# Patient Record
Sex: MALE | Race: BLACK OR AFRICAN AMERICAN | URBAN - METROPOLITAN AREA
[De-identification: names, ages, dates, MRNs, and addresses within clinical notes are randomized per-mention and may not be internally consistent; named-entity substitution may affect disease eponyms.]

---

## 2020-11-18 ENCOUNTER — OFFICE VISIT (OUTPATIENT)
Dept: URBAN - METROPOLITAN AREA CLINIC 118 | Facility: CLINIC | Age: 80
End: 2020-11-18
Payer: MEDICARE

## 2020-11-18 DIAGNOSIS — K62.5 RECTAL BLEEDING: ICD-10-CM

## 2020-11-18 DIAGNOSIS — Z12.11 COLON CANCER SCREENING: ICD-10-CM

## 2020-11-18 DIAGNOSIS — D63.8 ANEMIA OF CHRONIC DISEASE: ICD-10-CM

## 2020-11-18 PROCEDURE — G8482 FLU IMMUNIZE ORDER/ADMIN: HCPCS | Performed by: INTERNAL MEDICINE

## 2020-11-18 PROCEDURE — G8420 CALC BMI NORM PARAMETERS: HCPCS | Performed by: INTERNAL MEDICINE

## 2020-11-18 PROCEDURE — 99214 OFFICE O/P EST MOD 30 MIN: CPT | Performed by: INTERNAL MEDICINE

## 2020-11-18 PROCEDURE — G8427 DOCREV CUR MEDS BY ELIG CLIN: HCPCS | Performed by: INTERNAL MEDICINE

## 2020-11-18 PROCEDURE — 1036F TOBACCO NON-USER: CPT | Performed by: INTERNAL MEDICINE

## 2020-11-18 RX ORDER — LEVOTHYROXINE SODIUM 0.05 MG/1
TABLET ORAL
Qty: 0 | Refills: 0 | Status: ACTIVE | COMMUNITY
Start: 1900-01-01

## 2020-11-18 RX ORDER — FINASTERIDE 5 MG/1
TAKE 1 TABLET (5 MG) BY ORAL ROUTE ONCE DAILY TABLET, FILM COATED ORAL 1
Qty: 0 | Refills: 0 | Status: ACTIVE | COMMUNITY
Start: 1900-01-01

## 2020-11-18 RX ORDER — CLOPIDOGREL BISULFATE 75 MG
TAKE 1 TABLET (75 MG) BY ORAL ROUTE ONCE DAILY TABLET ORAL 1
Qty: 0 | Refills: 0 | Status: DISCONTINUED | COMMUNITY
Start: 1900-01-01

## 2020-11-18 NOTE — HPI-TODAY'S VISIT:
The patient was last seen in January for rectal bleeding, and a colonoscopy was planned.  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  He now feels markedly better with less symptoms from his aortic stenosis and is able to walk up a flight of stairs without severe shortness of breath.  He remains on Xarelto for his atrial fibrillation and still sees occasional bright red blood per rectum.  However there is no history of severe anemia.  He is compliant with daily Benefiber therapy and this is helped his irregular bowel movements.  There is no family history of colon cancer or polyps.  He denies chest pain, abdominal pain, or abnormal loss of weight.   He has chronic anemia, but iron levels WNL at Renal (Stage III CKD).

## 2020-11-19 PROBLEM — 234347009: Status: ACTIVE | Noted: 2020-11-18

## 2020-12-15 ENCOUNTER — OFFICE VISIT (OUTPATIENT)
Dept: URBAN - METROPOLITAN AREA MEDICAL CENTER 16 | Facility: MEDICAL CENTER | Age: 80
End: 2020-12-15
Payer: MEDICARE

## 2020-12-15 DIAGNOSIS — K62.5 ANAL BLEEDING: ICD-10-CM

## 2020-12-15 DIAGNOSIS — D12.4 ADENOMA OF DESCENDING COLON: ICD-10-CM

## 2020-12-15 DIAGNOSIS — Z86.010 H/O ADENOMATOUS POLYP OF COLON: ICD-10-CM

## 2020-12-15 PROCEDURE — 45385 COLONOSCOPY W/LESION REMOVAL: CPT | Performed by: INTERNAL MEDICINE

## 2020-12-15 PROCEDURE — G9937 DIG OR SURV COLSCO: HCPCS | Performed by: INTERNAL MEDICINE

## 2020-12-15 RX ORDER — FINASTERIDE 5 MG/1
TAKE 1 TABLET (5 MG) BY ORAL ROUTE ONCE DAILY TABLET, FILM COATED ORAL 1
Qty: 0 | Refills: 0 | Status: ACTIVE | COMMUNITY
Start: 1900-01-01

## 2020-12-15 RX ORDER — LEVOTHYROXINE SODIUM 0.05 MG/1
TABLET ORAL
Qty: 0 | Refills: 0 | Status: ACTIVE | COMMUNITY
Start: 1900-01-01

## 2022-10-28 ENCOUNTER — OFFICE VISIT (OUTPATIENT)
Dept: URBAN - METROPOLITAN AREA CLINIC 118 | Facility: CLINIC | Age: 82
End: 2022-10-28
Payer: MEDICARE

## 2022-10-28 VITALS
DIASTOLIC BLOOD PRESSURE: 66 MMHG | TEMPERATURE: 97.2 F | HEIGHT: 70 IN | SYSTOLIC BLOOD PRESSURE: 113 MMHG | WEIGHT: 160.6 LBS | HEART RATE: 78 BPM | BODY MASS INDEX: 22.99 KG/M2

## 2022-10-28 DIAGNOSIS — R15.0 INCOMPLETE DEFECATION: ICD-10-CM

## 2022-10-28 DIAGNOSIS — R19.5 LOOSE STOOLS: ICD-10-CM

## 2022-10-28 PROCEDURE — 99213 OFFICE O/P EST LOW 20 MIN: CPT

## 2022-10-28 RX ORDER — LEVOTHYROXINE SODIUM 0.05 MG/1
TABLET ORAL
Qty: 0 | Refills: 0 | Status: ACTIVE | COMMUNITY
Start: 1900-01-01

## 2022-10-28 RX ORDER — FINASTERIDE 5 MG/1
TAKE 1 TABLET (5 MG) BY ORAL ROUTE ONCE DAILY TABLET, FILM COATED ORAL 1
Qty: 0 | Refills: 0 | Status: ACTIVE | COMMUNITY
Start: 1900-01-01

## 2022-10-28 RX ORDER — CHOLESTYRAMINE 4 G/9G
1 SCOOP POWDER, FOR SUSPENSION ORAL ONCE A DAY
Qty: 1 | Refills: 2 | OUTPATIENT
Start: 2022-10-28

## 2022-10-28 NOTE — HPI-TODAY'S VISIT:
Mr. Monroy is a 81 y/o WM who presents today due to loose stools.  He reports for the past several years he has been having loose stool, not diarrhea but states they are "like mud consistency". He will have a BM and then 15 minutes later more will come out, but he does not always have the senstation to have another BM. He is at times have 5+ bowel movements/day.  He denies any GI bleeding, abdominal pain, unintentional weight loss. He was previously taking benefiber which helped but recently symptoms have been worsening.  His last colonoscopy was 12/15/20 with a 1mm polyp, diverticulosis and internal hemorrhoids. Recommendation was no need for repeat colonoscopy due to pt age.

## 2023-01-25 ENCOUNTER — OFFICE VISIT (OUTPATIENT)
Dept: URBAN - METROPOLITAN AREA CLINIC 118 | Facility: CLINIC | Age: 83
End: 2023-01-25
Payer: MEDICARE

## 2023-01-25 VITALS
WEIGHT: 163 LBS | BODY MASS INDEX: 23.34 KG/M2 | HEIGHT: 70 IN | HEART RATE: 73 BPM | TEMPERATURE: 97.2 F | DIASTOLIC BLOOD PRESSURE: 70 MMHG | SYSTOLIC BLOOD PRESSURE: 103 MMHG

## 2023-01-25 DIAGNOSIS — R19.5 LOOSE STOOLS: ICD-10-CM

## 2023-01-25 PROCEDURE — 99213 OFFICE O/P EST LOW 20 MIN: CPT

## 2023-01-25 RX ORDER — CHOLESTYRAMINE 4 G/9G
1 SCOOP POWDER, FOR SUSPENSION ORAL ONCE A DAY
Qty: 30 | Refills: 3 | OUTPATIENT
Start: 2023-01-25

## 2023-01-25 RX ORDER — FINASTERIDE 5 MG/1
TAKE 1 TABLET (5 MG) BY ORAL ROUTE ONCE DAILY TABLET, FILM COATED ORAL 1
Qty: 0 | Refills: 0 | Status: ACTIVE | COMMUNITY
Start: 1900-01-01

## 2023-01-25 RX ORDER — FUROSEMIDE 20 MG/1
1 TABLET TABLET ORAL ONCE A DAY
Status: ACTIVE | COMMUNITY

## 2023-01-25 RX ORDER — CHOLESTYRAMINE 4 G/9G
1 SCOOP POWDER, FOR SUSPENSION ORAL ONCE A DAY
Qty: 1 | Refills: 2 | Status: ACTIVE | COMMUNITY
Start: 2022-10-28

## 2023-01-25 RX ORDER — LEVOTHYROXINE SODIUM 0.05 MG/1
TABLET ORAL
Qty: 0 | Refills: 0 | Status: ACTIVE | COMMUNITY
Start: 1900-01-01

## 2023-01-25 NOTE — HPI-TODAY'S VISIT:
Pt is a 81 y/o WM who presents today for f/u on loose stools/ fecal incontinence.  He reports symptoms are much better. Was previously have 5-6 loose/ "muddy" stools/ day, now having 1-2 formed stools/day. Still will some intermittent incontience of liquid, but overall better. Only having loose stools 2-3x/week.  Currently taking cholestyramine daily and also flax seeds which have helped per patient.  Denies abdominal pain, GI bleeding, unintentional weight loss, NV, fever.

## 2023-01-27 ENCOUNTER — OFFICE VISIT (OUTPATIENT)
Dept: URBAN - METROPOLITAN AREA CLINIC 118 | Facility: CLINIC | Age: 83
End: 2023-01-27

## 2023-04-24 ENCOUNTER — OFFICE VISIT (OUTPATIENT)
Dept: URBAN - METROPOLITAN AREA CLINIC 118 | Facility: CLINIC | Age: 83
End: 2023-04-24
Payer: MEDICARE

## 2023-04-24 VITALS
HEART RATE: 75 BPM | SYSTOLIC BLOOD PRESSURE: 111 MMHG | DIASTOLIC BLOOD PRESSURE: 68 MMHG | WEIGHT: 164.6 LBS | HEIGHT: 70 IN | TEMPERATURE: 97.7 F | BODY MASS INDEX: 23.56 KG/M2

## 2023-04-24 DIAGNOSIS — R19.5 LOOSE STOOLS: ICD-10-CM

## 2023-04-24 DIAGNOSIS — R15.0 INCOMPLETE DEFECATION: ICD-10-CM

## 2023-04-24 PROCEDURE — 99214 OFFICE O/P EST MOD 30 MIN: CPT

## 2023-04-24 RX ORDER — COLESEVELAM HYDROCHLORIDE 625 MG/1
1 TABLETS WITH MEALS TABLET, FILM COATED ORAL TWICE A DAY
Qty: 180 TABLET | Refills: 1 | OUTPATIENT
Start: 2023-04-24

## 2023-04-24 RX ORDER — CHOLESTYRAMINE 4 G/9G
1 SCOOP POWDER, FOR SUSPENSION ORAL ONCE A DAY
Qty: 30 | Refills: 3 | Status: ACTIVE | COMMUNITY
Start: 2023-01-25

## 2023-04-24 RX ORDER — CHOLESTYRAMINE 4 G/9G
1 SCOOP POWDER, FOR SUSPENSION ORAL ONCE A DAY
Qty: 1 | Refills: 2 | Status: ON HOLD | COMMUNITY
Start: 2022-10-28

## 2023-04-24 RX ORDER — FUROSEMIDE 20 MG/1
1 TABLET TABLET ORAL ONCE A DAY
Status: ACTIVE | COMMUNITY

## 2023-04-24 RX ORDER — FINASTERIDE 5 MG/1
TAKE 1 TABLET (5 MG) BY ORAL ROUTE ONCE DAILY TABLET, FILM COATED ORAL 1
Qty: 0 | Refills: 0 | Status: ACTIVE | COMMUNITY
Start: 1900-01-01

## 2023-04-24 RX ORDER — LEVOTHYROXINE SODIUM 0.05 MG/1
TABLET ORAL
Qty: 0 | Refills: 0 | Status: ACTIVE | COMMUNITY
Start: 1900-01-01

## 2023-04-24 NOTE — HPI-TODAY'S VISIT:
4/24/23- Pt is a 83 y/o WM who presents today for f/u appt.  Has been doing well since last visit. Currently having 3 formed stools per day on the powder, still with intermittent leakage. Will have more frequent BMs in the morning. Feels like symptoms are currently 80% controlled but would like to discuss an alternative due to the taste of the powder.  Denies abdominal pain, GI bleeding, NV, unintentional weight loss or fever.  ----------------- 1/25/23- Pt is a 83 y/o WM who presents today for f/u on loose stools/ fecal incontinence.  He reports symptoms are much better. Was previously have 5-6 loose/ "muddy" stools/ day, now having 1-2 formed stools/day. Still will some intermittent incontience of liquid, but overall better. Only having loose stools 2-3x/week.  Currently taking cholestyramine daily and also flax seeds which have helped per patient.  Denies abdominal pain, GI bleeding, unintentional weight loss, NV, fever.

## 2023-08-01 ENCOUNTER — OFFICE VISIT (OUTPATIENT)
Dept: URBAN - METROPOLITAN AREA CLINIC 118 | Facility: CLINIC | Age: 83
End: 2023-08-01
Payer: MEDICARE

## 2023-08-01 ENCOUNTER — LAB OUTSIDE AN ENCOUNTER (OUTPATIENT)
Dept: URBAN - METROPOLITAN AREA CLINIC 118 | Facility: CLINIC | Age: 83
End: 2023-08-01

## 2023-08-01 VITALS
HEART RATE: 76 BPM | SYSTOLIC BLOOD PRESSURE: 111 MMHG | TEMPERATURE: 97.7 F | HEIGHT: 68 IN | DIASTOLIC BLOOD PRESSURE: 66 MMHG | WEIGHT: 158 LBS | BODY MASS INDEX: 23.95 KG/M2

## 2023-08-01 DIAGNOSIS — R15.0 INCOMPLETE DEFECATION: ICD-10-CM

## 2023-08-01 DIAGNOSIS — R63.4 WEIGHT LOSS: ICD-10-CM

## 2023-08-01 DIAGNOSIS — R19.5 LOOSE STOOLS: ICD-10-CM

## 2023-08-01 PROCEDURE — 99214 OFFICE O/P EST MOD 30 MIN: CPT

## 2023-08-01 RX ORDER — FINASTERIDE 5 MG/1
TAKE 1 TABLET (5 MG) BY ORAL ROUTE ONCE DAILY TABLET, FILM COATED ORAL 1
Qty: 0 | Refills: 0 | Status: ACTIVE | COMMUNITY
Start: 1900-01-01

## 2023-08-01 RX ORDER — CHOLESTYRAMINE 4 G/9G
1 SCOOP POWDER, FOR SUSPENSION ORAL ONCE A DAY
Qty: 30 | Refills: 3 | Status: ACTIVE | COMMUNITY
Start: 2023-01-25

## 2023-08-01 RX ORDER — COLESEVELAM HYDROCHLORIDE 625 MG/1
1 TABLETS WITH MEALS TABLET, FILM COATED ORAL TWICE A DAY
Qty: 180 TABLET | Refills: 1 | Status: ACTIVE | COMMUNITY
Start: 2023-04-24

## 2023-08-01 RX ORDER — COLESEVELAM HYDROCHLORIDE 625 MG/1
3 TABLETS WITH MEALS TABLET, FILM COATED ORAL TWICE A DAY
Qty: 540 TABLET | Refills: 1 | OUTPATIENT

## 2023-08-01 RX ORDER — FUROSEMIDE 20 MG/1
1 TABLET TABLET ORAL ONCE A DAY
Status: ACTIVE | COMMUNITY

## 2023-08-01 RX ORDER — CHOLESTYRAMINE 4 G/9G
1 SCOOP POWDER, FOR SUSPENSION ORAL ONCE A DAY
Qty: 1 | Refills: 2 | Status: ON HOLD | COMMUNITY
Start: 2022-10-28

## 2023-08-01 RX ORDER — LEVOTHYROXINE SODIUM 0.05 MG/1
TABLET ORAL
Qty: 0 | Refills: 0 | Status: ACTIVE | COMMUNITY
Start: 1900-01-01

## 2023-08-01 NOTE — HPI-TODAY'S VISIT:
8/1/23- Pt is a 82 y/o WM who presents today for f/u.  Currently on Welchol 1 tablet BID with improvement in symptoms, but still with some leakage and soft/ loose stools. Has not tried to increase dose. He has had 6# of WL since last visit. Has decreased appetite and early satiety. He denies any abdominal pain, rectal bleeding or NV. No recent CT.  ---------------- 4/24/23- Pt is a 81 y/o WM who presents today for f/u appt.  Has been doing well since last visit. Currently having 3 formed stools per day on the powder, still with intermittent leakage. Will have more frequent BMs in the morning. Feels like symptoms are currently 80% controlled but would like to discuss an alternative due to the taste of the powder.  Denies abdominal pain, GI bleeding, NV, unintentional weight loss or fever.  ----------------- 1/25/23- Pt is a 81 y/o WM who presents today for f/u on loose stools/ fecal incontinence.  He reports symptoms are much better. Was previously have 5-6 loose/ "muddy" stools/ day, now having 1-2 formed stools/day. Still will some intermittent incontience of liquid, but overall better. Only having loose stools 2-3x/week.  Currently taking cholestyramine daily and also flax seeds which have helped per patient.  Denies abdominal pain, GI bleeding, unintentional weight loss, NV, fever.

## 2023-08-09 ENCOUNTER — LAB OUTSIDE AN ENCOUNTER (OUTPATIENT)
Dept: URBAN - METROPOLITAN AREA CLINIC 109 | Facility: CLINIC | Age: 83
End: 2023-08-09

## 2023-08-09 LAB
CREATININE POC: 2
PERFORMING LAB: (no result)

## 2023-08-14 ENCOUNTER — TELEPHONE ENCOUNTER (OUTPATIENT)
Dept: URBAN - METROPOLITAN AREA CLINIC 118 | Facility: CLINIC | Age: 83
End: 2023-08-14

## 2023-08-14 LAB — FECAL FAT, QUALITATIVE: (no result)

## 2023-08-18 ENCOUNTER — TELEPHONE ENCOUNTER (OUTPATIENT)
Dept: URBAN - METROPOLITAN AREA CLINIC 118 | Facility: CLINIC | Age: 83
End: 2023-08-18

## 2023-08-18 RX ORDER — PANCRELIPASE LIPASE, PANCRELIPASE PROTEASE, PANCRELIPASE AMYLASE 40000; 126000; 168000 [USP'U]/1; [USP'U]/1; [USP'U]/1
2 CAPSULES DAILY WITH EVERY MEAL AND 1 WITH SNACKS CAPSULE, DELAYED RELEASE ORAL
Qty: 300 | Refills: 5 | OUTPATIENT
Start: 2023-08-18 | End: 2024-02-13

## 2023-08-21 ENCOUNTER — TELEPHONE ENCOUNTER (OUTPATIENT)
Dept: URBAN - METROPOLITAN AREA CLINIC 118 | Facility: CLINIC | Age: 83
End: 2023-08-21

## 2023-08-21 RX ORDER — PANCRELIPASE 36000; 180000; 114000 [USP'U]/1; [USP'U]/1; [USP'U]/1
TAKE 2 CAPSULES DAILY WITH MEALS AND 1 WITH SNACKS CAPSULE, DELAYED RELEASE PELLETS ORAL
Qty: 300 | Refills: 5 | OUTPATIENT
Start: 2023-08-21 | End: 2024-02-16

## 2023-09-12 ENCOUNTER — ERX REFILL RESPONSE (OUTPATIENT)
Dept: URBAN - METROPOLITAN AREA CLINIC 118 | Facility: CLINIC | Age: 83
End: 2023-09-12

## 2023-09-12 RX ORDER — COLESEVELAM HYDROCHLORIDE 625 MG/1
3 TABLETS WITH MEALS TABLET, FILM COATED ORAL TWICE A DAY
Qty: 540 TABLET | Refills: 1 | OUTPATIENT

## 2023-10-13 ENCOUNTER — OFFICE VISIT (OUTPATIENT)
Dept: URBAN - METROPOLITAN AREA CLINIC 118 | Facility: CLINIC | Age: 83
End: 2023-10-13
Payer: MEDICARE

## 2023-10-13 VITALS
DIASTOLIC BLOOD PRESSURE: 73 MMHG | HEIGHT: 68 IN | WEIGHT: 155 LBS | TEMPERATURE: 97 F | BODY MASS INDEX: 23.49 KG/M2 | HEART RATE: 68 BPM | SYSTOLIC BLOOD PRESSURE: 122 MMHG

## 2023-10-13 DIAGNOSIS — R19.5 LOOSE STOOLS: ICD-10-CM

## 2023-10-13 DIAGNOSIS — K86.81 EXOCRINE PANCREATIC INSUFFICIENCY: ICD-10-CM

## 2023-10-13 DIAGNOSIS — K62.89 DECREASED RECTAL SPHINCTER TONE: ICD-10-CM

## 2023-10-13 DIAGNOSIS — R15.0 INCOMPLETE DEFECATION: ICD-10-CM

## 2023-10-13 DIAGNOSIS — R63.4 WEIGHT LOSS: ICD-10-CM

## 2023-10-13 DIAGNOSIS — K86.1 ACUTE ON CHRONIC PANCREATITIS: ICD-10-CM

## 2023-10-13 PROCEDURE — 99214 OFFICE O/P EST MOD 30 MIN: CPT

## 2023-10-13 RX ORDER — LEVOTHYROXINE SODIUM 0.05 MG/1
TABLET ORAL
Qty: 0 | Refills: 0 | Status: ACTIVE | COMMUNITY
Start: 1900-01-01

## 2023-10-13 RX ORDER — FUROSEMIDE 20 MG/1
1 TABLET TABLET ORAL ONCE A DAY
Status: ACTIVE | COMMUNITY

## 2023-10-13 RX ORDER — PANCRELIPASE LIPASE, PANCRELIPASE PROTEASE, PANCRELIPASE AMYLASE 40000; 126000; 168000 [USP'U]/1; [USP'U]/1; [USP'U]/1
2 CAPSULES DAILY WITH EVERY MEAL AND 1 WITH SNACKS CAPSULE, DELAYED RELEASE ORAL
Qty: 300 | Refills: 5 | Status: DISCONTINUED | COMMUNITY
Start: 2023-08-18 | End: 2024-02-13

## 2023-10-13 RX ORDER — FINASTERIDE 5 MG/1
TAKE 1 TABLET (5 MG) BY ORAL ROUTE ONCE DAILY TABLET, FILM COATED ORAL 1
Qty: 0 | Refills: 0 | Status: ACTIVE | COMMUNITY
Start: 1900-01-01

## 2023-10-13 RX ORDER — PANCRELIPASE 36000; 180000; 114000 [USP'U]/1; [USP'U]/1; [USP'U]/1
TAKE 6 CAPSULES DAILY WITH MEALS AND 1 WITH SNACKS CAPSULE, DELAYED RELEASE PELLETS ORAL
Qty: 500 | Refills: 5
Start: 2023-08-21 | End: 2024-04-10

## 2023-10-13 RX ORDER — CHOLESTYRAMINE 4 G/9G
1 SCOOP POWDER, FOR SUSPENSION ORAL ONCE A DAY
Qty: 30 | Refills: 3 | Status: DISCONTINUED | COMMUNITY
Start: 2023-01-25

## 2023-10-13 RX ORDER — CHOLESTYRAMINE 4 G/9G
1 SCOOP POWDER, FOR SUSPENSION ORAL ONCE A DAY
Qty: 1 | Refills: 2 | Status: DISCONTINUED | COMMUNITY
Start: 2022-10-28

## 2023-10-13 RX ORDER — PANCRELIPASE 36000; 180000; 114000 [USP'U]/1; [USP'U]/1; [USP'U]/1
TAKE 2 CAPSULES DAILY WITH MEALS AND 1 WITH SNACKS CAPSULE, DELAYED RELEASE PELLETS ORAL
Qty: 300 | Refills: 5 | Status: ACTIVE | COMMUNITY
Start: 2023-08-21 | End: 2024-02-16

## 2023-10-13 RX ORDER — COLESEVELAM HYDROCHLORIDE 625 MG/1
3 TABLETS WITH MEALS TABLET, FILM COATED ORAL TWICE A DAY
Qty: 540 TABLET | Refills: 1 | Status: DISCONTINUED | COMMUNITY

## 2023-10-13 NOTE — HPI-TODAY'S VISIT:
10/13/23- Pt is a 82 y/o WM who presents today for f/u. Wife present with him.  Since last visit, he was noted to have abnormal fecal fat level in his stool. CT A/P was unremarkable. He was started on Creon. He is currently taking 2 capsules with breakfast and 2 with dinner. He does not eat lunch. Is not taking any with snacks.  He reports ~3 loose stools in the morning prior to and after breakfast. Then he is typically without loose stools for the rest of the day. Still with some incontience and decreased urge to have a BM.  He denies abdominal pain, NV, changes in bowel habits or reflux/ heatburn. His weight is down 3# since last visit. Reports decreased appetite still. He is going to try Boost/ensure.  -------------------------- 8/1/23- Pt is a 82 y/o WM who presents today for f/u.  Currently on Welchol 1 tablet BID with improvement in symptoms, but still with some leakage and soft/ loose stools. Has not tried to increase dose. He has had 6# of WL since last visit. Has decreased appetite and early satiety. He denies any abdominal pain, rectal bleeding or NV. No recent CT.  ---------------- 4/24/23- Pt is a 83 y/o WM who presents today for f/u appt.  Has been doing well since last visit. Currently having 3 formed stools per day on the powder, still with intermittent leakage. Will have more frequent BMs in the morning. Feels like symptoms are currently 80% controlled but would like to discuss an alternative due to the taste of the powder.  Denies abdominal pain, GI bleeding, NV, unintentional weight loss or fever.  ----------------- 1/25/23- Pt is a 83 y/o WM who presents today for f/u on loose stools/ fecal incontinence.  He reports symptoms are much better. Was previously have 5-6 loose/ "muddy" stools/ day, now having 1-2 formed stools/day. Still will some intermittent incontience of liquid, but overall better. Only having loose stools 2-3x/week.  Currently taking cholestyramine daily and also flax seeds which have helped per patient.  Denies abdominal pain, GI bleeding, unintentional weight loss, NV, fever.

## 2023-10-17 ENCOUNTER — TELEPHONE ENCOUNTER (OUTPATIENT)
Dept: URBAN - METROPOLITAN AREA CLINIC 118 | Facility: CLINIC | Age: 83
End: 2023-10-17

## 2023-10-17 RX ORDER — PANCRELIPASE 36000; 180000; 114000 [USP'U]/1; [USP'U]/1; [USP'U]/1
TAKE 6 CAPSULES DAILY WITH MEALS AND 1 WITH SNACKS CAPSULE, DELAYED RELEASE PELLETS ORAL
Qty: 500 | Refills: 5
Start: 2023-08-21 | End: 2024-04-14

## 2024-01-16 ENCOUNTER — OFFICE VISIT (OUTPATIENT)
Dept: URBAN - METROPOLITAN AREA CLINIC 118 | Facility: CLINIC | Age: 84
End: 2024-01-16
Payer: MEDICARE

## 2024-01-16 VITALS
HEART RATE: 82 BPM | BODY MASS INDEX: 23.04 KG/M2 | WEIGHT: 152 LBS | SYSTOLIC BLOOD PRESSURE: 121 MMHG | DIASTOLIC BLOOD PRESSURE: 70 MMHG | HEIGHT: 68 IN | TEMPERATURE: 97 F

## 2024-01-16 DIAGNOSIS — N39.41 URGENCY INCONTINENCE: ICD-10-CM

## 2024-01-16 DIAGNOSIS — N39.490 OVERFLOW INCONTINENCE: ICD-10-CM

## 2024-01-16 PROBLEM — 87557004: Status: ACTIVE | Noted: 2024-01-16

## 2024-01-16 PROBLEM — 397878005: Status: ACTIVE | Noted: 2024-01-16

## 2024-01-16 PROCEDURE — 99213 OFFICE O/P EST LOW 20 MIN: CPT | Performed by: INTERNAL MEDICINE

## 2024-01-16 RX ORDER — LEVOTHYROXINE SODIUM 0.05 MG/1
TABLET ORAL
Qty: 0 | Refills: 0 | Status: ACTIVE | COMMUNITY
Start: 1900-01-01

## 2024-01-16 RX ORDER — FINASTERIDE 5 MG/1
TAKE 1 TABLET (5 MG) BY ORAL ROUTE ONCE DAILY TABLET, FILM COATED ORAL 1
Qty: 0 | Refills: 0 | Status: ACTIVE | COMMUNITY
Start: 1900-01-01

## 2024-01-16 RX ORDER — FUROSEMIDE 20 MG/1
1 TABLET TABLET ORAL ONCE A DAY
Status: ACTIVE | COMMUNITY

## 2024-01-16 RX ORDER — PANCRELIPASE 36000; 180000; 114000 [USP'U]/1; [USP'U]/1; [USP'U]/1
TAKE 6 CAPSULES DAILY WITH MEALS AND 1 WITH SNACKS CAPSULE, DELAYED RELEASE PELLETS ORAL
Qty: 500 | Refills: 5 | Status: ACTIVE | COMMUNITY
Start: 2023-08-21 | End: 2024-04-14

## 2024-01-16 NOTE — HPI-TODAY'S VISIT:
The patient is following up after an office visit in October.  He has chronic intermittent loose stools with occasional incontinence.  He has tried both WelChol and Creon for the symptoms and both reduced the bowel movement frequency from about 6 to 3/day.  He still has a sensation of incomplete evacuation and intermittent seepage of stool.  His bowel movements are often small in volume and usually in the early morning but he will have seepage in the afternoon.  He has no nocturnal diarrhea nor blood in the stools.  He eats a moderate fiber diet his colonoscopy was unremarkable in 2020.

## 2024-02-28 ENCOUNTER — OV EP (OUTPATIENT)
Dept: URBAN - METROPOLITAN AREA CLINIC 118 | Facility: CLINIC | Age: 84
End: 2024-02-28
Payer: MEDICARE

## 2024-02-28 VITALS
HEART RATE: 79 BPM | WEIGHT: 150.6 LBS | BODY MASS INDEX: 22.82 KG/M2 | DIASTOLIC BLOOD PRESSURE: 67 MMHG | HEIGHT: 68 IN | TEMPERATURE: 97.5 F | SYSTOLIC BLOOD PRESSURE: 114 MMHG

## 2024-02-28 DIAGNOSIS — K59.09 CHRONIC CONSTIPATION WITH OVERFLOW INCONTINENCE: ICD-10-CM

## 2024-02-28 PROCEDURE — 99213 OFFICE O/P EST LOW 20 MIN: CPT | Performed by: INTERNAL MEDICINE

## 2024-02-28 RX ORDER — PANCRELIPASE 36000; 180000; 114000 [USP'U]/1; [USP'U]/1; [USP'U]/1
TAKE 6 CAPSULES DAILY WITH MEALS AND 1 WITH SNACKS CAPSULE, DELAYED RELEASE PELLETS ORAL
Qty: 500 | Refills: 5 | Status: ACTIVE | COMMUNITY
Start: 2023-08-21 | End: 2024-04-14

## 2024-02-28 RX ORDER — FENOFIBRATE 160 MG/1
TABLET ORAL
Qty: 90 TABLET | Status: ACTIVE | COMMUNITY

## 2024-02-28 RX ORDER — FUROSEMIDE 20 MG/1
1 TABLET TABLET ORAL ONCE A DAY
Status: ACTIVE | COMMUNITY

## 2024-02-28 RX ORDER — RIVAROXABAN 15 MG/1
TABLET, FILM COATED ORAL
Qty: 90 TABLET | Status: ACTIVE | COMMUNITY

## 2024-02-28 RX ORDER — LEVOTHYROXINE SODIUM 0.05 MG/1
TABLET ORAL
Qty: 0 | Refills: 0 | Status: ACTIVE | COMMUNITY
Start: 1900-01-01

## 2024-02-28 RX ORDER — METOPROLOL SUCCINATE 50 MG/1
TABLET, EXTENDED RELEASE ORAL
Qty: 90 TABLET | Status: ACTIVE | COMMUNITY

## 2024-02-28 RX ORDER — FINASTERIDE 5 MG/1
TAKE 1 TABLET (5 MG) BY ORAL ROUTE ONCE DAILY TABLET, FILM COATED ORAL 1
Qty: 0 | Refills: 0 | Status: ACTIVE | COMMUNITY
Start: 1900-01-01

## 2024-02-28 NOTE — HPI-TODAY'S VISIT:
The patient is seen with his wife after an office visit in January.  He stopped Creon for his irregular bowel habits and began Senokot and fiber daily therapy.  His bowel movements are markedly improved.  He only has 1-3 bowel movements per day with less urgency.  There is almost no incontinence.  There is no blood in the stool.  He denies abdominal pain or weight loss.